# Patient Record
Sex: FEMALE | Race: AMERICAN INDIAN OR ALASKA NATIVE
[De-identification: names, ages, dates, MRNs, and addresses within clinical notes are randomized per-mention and may not be internally consistent; named-entity substitution may affect disease eponyms.]

---

## 2019-07-27 ENCOUNTER — HOSPITAL ENCOUNTER (OUTPATIENT)
Dept: HOSPITAL 5 - TRG | Age: 24
LOS: 1 days | Discharge: HOME | End: 2019-07-28
Attending: OBSTETRICS & GYNECOLOGY
Payer: MEDICAID

## 2019-07-27 DIAGNOSIS — O47.03: Primary | ICD-10-CM

## 2019-07-27 DIAGNOSIS — Z3A.37: ICD-10-CM

## 2019-07-27 PROCEDURE — 59025 FETAL NON-STRESS TEST: CPT

## 2019-07-28 ENCOUNTER — HOSPITAL ENCOUNTER (INPATIENT)
Dept: HOSPITAL 5 - TRG | Age: 24
LOS: 3 days | Discharge: HOME | End: 2019-07-31
Attending: OBSTETRICS & GYNECOLOGY | Admitting: OBSTETRICS & GYNECOLOGY
Payer: MEDICAID

## 2019-07-28 DIAGNOSIS — Z3A.37: ICD-10-CM

## 2019-07-28 DIAGNOSIS — Z23: ICD-10-CM

## 2019-07-28 DIAGNOSIS — O42.92: ICD-10-CM

## 2019-07-28 LAB
BUN SERPL-MCNC: 2 MG/DL (ref 7–17)
BUN/CREAT SERPL: 3 %
CALCIUM SERPL-MCNC: 9.1 MG/DL (ref 8.4–10.2)
HCT VFR BLD CALC: 31.8 % (ref 30.3–42.9)
HEMOLYSIS INDEX: 25
HGB BLD-MCNC: 10.6 GM/DL (ref 10.1–14.3)
MCHC RBC AUTO-ENTMCNC: 33 % (ref 30–34)
MCV RBC AUTO: 87 FL (ref 79–97)
PLATELET # BLD: 134 K/MM3 (ref 140–440)
RBC # BLD AUTO: 3.66 M/MM3 (ref 3.65–5.03)

## 2019-07-28 PROCEDURE — 80048 BASIC METABOLIC PNL TOTAL CA: CPT

## 2019-07-28 PROCEDURE — 00HU33Z INSERTION OF INFUSION DEVICE INTO SPINAL CANAL, PERCUTANEOUS APPROACH: ICD-10-PCS

## 2019-07-28 PROCEDURE — 84460 ALANINE AMINO (ALT) (SGPT): CPT

## 2019-07-28 PROCEDURE — 85027 COMPLETE CBC AUTOMATED: CPT

## 2019-07-28 PROCEDURE — 84450 TRANSFERASE (AST) (SGOT): CPT

## 2019-07-28 PROCEDURE — 81001 URINALYSIS AUTO W/SCOPE: CPT

## 2019-07-28 PROCEDURE — 85018 HEMOGLOBIN: CPT

## 2019-07-28 PROCEDURE — 10H07YZ INSERTION OF OTHER DEVICE INTO PRODUCTS OF CONCEPTION, VIA NATURAL OR ARTIFICIAL OPENING: ICD-10-PCS | Performed by: OBSTETRICS & GYNECOLOGY

## 2019-07-28 PROCEDURE — 83735 ASSAY OF MAGNESIUM: CPT

## 2019-07-28 PROCEDURE — 85014 HEMATOCRIT: CPT

## 2019-07-28 PROCEDURE — 59025 FETAL NON-STRESS TEST: CPT

## 2019-07-28 PROCEDURE — 83615 LACTATE (LD) (LDH) ENZYME: CPT

## 2019-07-28 PROCEDURE — 86850 RBC ANTIBODY SCREEN: CPT

## 2019-07-28 PROCEDURE — 36415 COLL VENOUS BLD VENIPUNCTURE: CPT

## 2019-07-28 PROCEDURE — 88307 TISSUE EXAM BY PATHOLOGIST: CPT

## 2019-07-28 PROCEDURE — 84550 ASSAY OF BLOOD/URIC ACID: CPT

## 2019-07-28 PROCEDURE — 87086 URINE CULTURE/COLONY COUNT: CPT

## 2019-07-28 PROCEDURE — 86900 BLOOD TYPING SEROLOGIC ABO: CPT

## 2019-07-28 PROCEDURE — 86901 BLOOD TYPING SEROLOGIC RH(D): CPT

## 2019-07-28 PROCEDURE — 86592 SYPHILIS TEST NON-TREP QUAL: CPT

## 2019-07-28 PROCEDURE — 3E0R3BZ INTRODUCTION OF ANESTHETIC AGENT INTO SPINAL CANAL, PERCUTANEOUS APPROACH: ICD-10-PCS

## 2019-07-28 PROCEDURE — 82565 ASSAY OF CREATININE: CPT

## 2019-07-28 PROCEDURE — 90715 TDAP VACCINE 7 YRS/> IM: CPT

## 2019-07-28 RX ADMIN — HYDRALAZINE HYDROCHLORIDE PRN MG: 20 INJECTION INTRAMUSCULAR; INTRAVENOUS at 20:04

## 2019-07-28 RX ADMIN — HYDRALAZINE HYDROCHLORIDE PRN MG: 20 INJECTION INTRAMUSCULAR; INTRAVENOUS at 19:13

## 2019-07-28 RX ADMIN — HYDRALAZINE HYDROCHLORIDE PRN MG: 20 INJECTION INTRAMUSCULAR; INTRAVENOUS at 21:55

## 2019-07-28 NOTE — PROCEDURE NOTE
OB Delivery Note





- Delivery


Date of Delivery: 19


Surgeon: YOVANY GUNTER ([facility])


Estimated blood loss: 200cc





- Vaginal


Delivery presentation: vertex


Delivery position: OA


Intrapartum events: PROM->1hr before delivery, preeclampsia


Delivery induction: none


Delivery augmentation: pitocin


Delivery monitor: external FHT, external uterine


Route of delivery: 


Delivery placenta: expressed


Delivery cord: 3 umbilical vessels


Episiotomy: none


Delivery laceration: none


Anesthesia: epidural


Delivery comments: 


Excellent maternal effort resulted in  of viable male infant at 1939. Head 

delivered OA, restituted LOT. Shoulders followed easily. Cord clamped and cut by

FOB. Placenta delivered intact at 194. Minimal bleeding noted. Fundus boggy 

after delivery of placenta, MALU firm. Pitocin IV administered. No lacerations 

noted. Fundus then firm, Cytotec 800mcg administered IL for prophylaxis of 

delayed hemorrhage. 





- Infant


  ** A


Apgar at 1 minute: 8


Apgar at 5 minutes: 9


Infant Gender: Male

## 2019-07-28 NOTE — EVENT NOTE
Date: 19


S: Pt presents to triage reporting strong contractions q5 minutes. Affirms 

positive fetal movement. Denies LOF or vaginal bleeding





O: FHT baseline 150 bpm, no decels, pos accels, mod variability


Contractions q5 minutes lasting 60 seconds


SVE /0





A: 25 yo  at 37.2 wks EGA in early labor


Membranes intact


Coping well





P: Recommend going home to rest with Benadryl


Return to hospital with 3-1-1 contractions, SROM, or DFM


Pt expresses understanding and agrees with plan

## 2019-07-28 NOTE — EVENT NOTE
Date: 07/28/19


Pt's SVE is 7/80/0 swollen anterior lip. Discussed r/b/a of IUPC, pt elects for 

it. Placed IUPC without blood flashback, pt tolerated well, FHT category 1 

throughout.

## 2019-07-28 NOTE — HISTORY AND PHYSICAL REPORT
History of Present Illness


Date of examination: 19


Date of admission: 


19 06:46





Chief complaint: 


Contractions


History of present illness: 


Pt is a 23 yo  at 37.2 wks EGA who presents with contractions q3 minutes. 

She affirms positive FM and denies LOF or vaginal bleeding. Deneis SAWYER, 

scotomata, swelling. She received prenatal care at [facility]. Her 

pregnancy has been complicated by Rubella equivocal status, quad screen positive

for T21 with NIPT low risk, glucose intolerance, and GBS positive status. 





Past History


Past Medical History: no pertinent history


Past Surgical History: no surgical history


Social history: no significant social history





- Obstetrical History


Expected Date of Delivery: 19


Actual Gestation: 37 Week(s) 2 Day(s) 


: 2


Para: 0


Hx # Term Pregnancies: 0


Number of  Pregnancies: 0


Spontaneous Abortions: 1


Number of Living Children: 0





Medications and Allergies


                                    Allergies











Allergy/AdvReac Type Severity Reaction Status Date / Time


 


No Known Allergies Allergy   Unverified 19 15:26











                                Home Medications











 Medication  Instructions  Recorded  Confirmed  Last Taken  Type


 


Ondansetron [Zofran Odt] 4 mg PO Q8HR PRN #10 tab.rapdis 19  Unknown Rx











Active Meds: 


Active Medications





Butorphanol Tartrate (Stadol)  2 mg IV Q2H PRN


   PRN Reason: Pain , Severe (7-10)


   Last Admin: 19 07:50 Dose:  2 mg


   Documented by: 


Ephedrine Sulfate (Ephedrine Sulfate)  10 mg IV Q2M PRN


   PRN Reason: Hypotension


Fentanyl (Sublimaze)  100 mcg IV Q2H PRN


   PRN Reason: Labor Pain


Hydralazine HCl (Apresoline)  5 mg IV Q30MIN PRN


   PRN Reason: Hypertension


Oxytocin/Sodium Chloride (Pitocin/Ns 20 Unit/1000ml Drip)  20 units in 1,000 mls

@ 125 mls/hr IV AS DIRECT GAVIOTA


Oxytocin/Sodium Chloride (Pitocin/Ns 30 Unit/500ml)  30 units in 500 mls @ 1 

mls/hr IV TITR GAVIOTA; Protocol


Oxytocin/Sodium Chloride (Pitocin/Ns 30 Unit/500ml)  30 units in 500 mls @ 2 

mls/hr IV TITR GAVIOTA; Protocol


Lactated Ringer's (Lactated Ringers)  1,000 mls @ 125 mls/hr IV AS DIRECT G[facility]IOTA


   Last Admin: 19 07:45 Dose:  125 mls/hr


   Documented by: 


Oxytocin/Sodium Chloride (Pitocin/Ns 20 Unit/1000ml Drip)  20 units in 1,000 mls

@ 0 mls/hr IV TITR GAVIOTA


Magnesium Sulfate (Magnesium Sulfate 40gm/1000ml)  40 gm in 1,000 mls @ 25 mls/h

r IV AS DIRECT GAVIOTA


Ampicillin Sodium (Ampicillin/Ns 1 Gm/50 Ml)  1 gm in 50 mls @ 100 mls/hr IV 

Q4HR GAVIOTA; Protocol


Lactated Ringer's (Lactated Ringers)  1,000 mls @ 125 mls/hr IV AS DIRECT GAVIOTA


Labetalol HCl (Normodyne)  10 mg IV ONCE PRN


   PRN Reason: Hypertension


Mineral Oil (Mineral Oil)  30 ml PO QHS PRN


   PRN Reason: Constipation


Nalbuphine HCl (Nubain)  10 mg IV Q2H PRN


   PRN Reason: Pain, Moderate (4-6)


Naloxone HCl (Narcan 0.4 Mg/1 Ml)  0.1 mg IV Q2MIN PRN


   PRN Reason: Res Rate </= 8 or 02 SAT < 92%


Ondansetron HCl (Zofran)  4 mg IV Q8H PRN


   PRN Reason: Nausea And Vomiting


Promethazine HCl (Phenergan)  25 mg PO Q6H PRN


   PRN Reason: Nausea And Vomiting


Terbutaline Sulfate (Brethine)  0.25 mg SUB-Q ONCE PRN


   PRN Reason: Hyperstimulation/Hypertonicity


Terbutaline Sulfate (Brethine)  0.25 mg IVP ONCE PRN


   PRN Reason: Hyperstimulation/Hypertonicity











Review of Systems


All systems: negative


Eyes: no blurred vision


Ears, nose, mouth and throat: no headache


Cardiovascular: no chest pain, no shortness of breath, no leg edema


Gastrointestinal: diarrhea, no nausea, no vomiting


Genitourinary: no vaginal bleeding, no leakage of fluid


Neurological: no seizures





- Vital Signs


Vital signs: 


                                   Vital Signs











Pulse BP


 


 68   171/96 


 


 19 06:25  19 06:25








                                        











Temp Pulse Resp BP Pulse Ox


 


    77      137/68    


 


    19 08:10     19 08:10   














- Physical Exam


Cardiovascular: Regular rate, Normal S1, Normal S2, No murmurs


Lungs: Positive: Clear to auscultation, Normal air movement


Abdomen: Positive: soft.  Negative: distention, tenderness


Genitourinary (Female): Positive: normal external genitalia, normal perenium


Vagina: Positive: normal moisture


Uterus: Positive: enlarged (gravid), normal contour


Extremities: Positive: normal





- Obstetrical


FHR: category 1


Uterine Contraction Monitor Mode: External


Cervical Dilatation: 3.5


Cervical Effacement Percentage: 90


Fetal station: 0


Uterine Contraction Frequency (min): 5


Uterine Contraction Pattern: Regular


Uterine Tone Measurement Phase: Contraction


Uterine Contraction Intensity: Strong/Firm





Results


Result Diagrams: 


                                 19 07:00





                                 19 07:00


                              Abnormal lab results











  19 Range/Units





  07:00 07:00 


 


WBC  11.2 H   (4.5-11.0)  K/mm3


 


RDW  15.9 H   (13.2-15.2)  %


 


Plt Count  134 L   (140-440)  K/mm3


 


Potassium   3.0 L  (3.6-5.0)  mmol/L


 


BUN   2 L  (7-17)  mg/dL


 


Glucose   103 H  ()  mg/dL








All other labs normal.








Assessment and Plan


A:


23 yo  at 37.2 wks EGA


Early labor


Preeclampsia with severe features


SROM in triage, clear fluid


GBS positive





P:


Admit to L&D


Initiate Magnesium Sulfate and rescue Labetalol


Ampicillin prophylaxis


Monitor closely


Augment with Pitocin


Pain relief as requested


Anticipate

## 2019-07-28 NOTE — ANESTHESIA CONSULTATION
Anesthesia Consult and Med Hx


Date of service: 07/28/19





- Airway


Anesthetic Teeth Evaluation: Good


ROM Head & Neck: Adequate


Mental/Hyoid Distance: Adequate


Mallampati Class: Class II


Intubation Access Assessment: Good





- Pulmonary Exam


CTA: Yes





- Cardiac Exam


Cardiac Exam: RRR





- Pre-Operative Health Status


ASA Pre-Surgery Classification: ASA2, Emergency


Proposed Anesthetic Plan: Epidural





- Pulmonary


Hx Asthma: No


COPD: No


Hx Pneumonia: No





- Cardiovascular System


Hx Hypertension: No





- Central Nervous System


Hx Seizures: No


Hx Psychiatric Problems: No





- Endocrine


Hx Renal Disease: No


Hx End Stage Renal Disease: No


Hx Hypothyroidism: No


Hx Hyperthyroidism: No





- Hematic


Hx Anemia: No


Hx Sickle Cell Disease: No





- Other Systems


Hx Alcohol Use: No

## 2019-07-29 LAB
ALT SERPL-CCNC: 11 UNITS/L (ref 7–56)
HCT VFR BLD CALC: 31.1 % (ref 30.3–42.9)
HGB BLD-MCNC: 10.5 GM/DL (ref 10.1–14.3)
MCHC RBC AUTO-ENTMCNC: 34 % (ref 30–34)
MCV RBC AUTO: 86 FL (ref 79–97)
PLATELET # BLD: 149 K/MM3 (ref 140–440)
RBC # BLD AUTO: 3.61 M/MM3 (ref 3.65–5.03)
URATE SERPL-MCNC: 4.5 MG/DL (ref 3.5–7.6)

## 2019-07-29 PROCEDURE — 3E0234Z INTRODUCTION OF SERUM, TOXOID AND VACCINE INTO MUSCLE, PERCUTANEOUS APPROACH: ICD-10-PCS | Performed by: OBSTETRICS & GYNECOLOGY

## 2019-07-29 RX ADMIN — DOCUSATE SODIUM SCH: 100 CAPSULE, LIQUID FILLED ORAL at 10:00

## 2019-07-29 RX ADMIN — OXYCODONE AND ACETAMINOPHEN PRN TAB: 5; 325 TABLET ORAL at 19:48

## 2019-07-29 RX ADMIN — IBUPROFEN SCH MG: 600 TABLET, FILM COATED ORAL at 19:47

## 2019-07-29 RX ADMIN — LABETALOL HYDROCHLORIDE SCH MG: 200 TABLET, FILM COATED ORAL at 21:53

## 2019-07-29 RX ADMIN — LABETALOL HYDROCHLORIDE SCH MG: 200 TABLET, FILM COATED ORAL at 08:28

## 2019-07-29 RX ADMIN — DOCUSATE SODIUM SCH MG: 100 CAPSULE, LIQUID FILLED ORAL at 21:52

## 2019-07-29 RX ADMIN — IBUPROFEN SCH: 600 TABLET, FILM COATED ORAL at 09:00

## 2019-07-29 RX ADMIN — IBUPROFEN SCH MG: 600 TABLET, FILM COATED ORAL at 23:57

## 2019-07-29 RX ADMIN — FERROUS SULFATE TAB 325 MG (65 MG ELEMENTAL FE) SCH MG: 325 (65 FE) TAB at 21:52

## 2019-07-29 RX ADMIN — FERROUS SULFATE TAB 325 MG (65 MG ELEMENTAL FE) SCH MG: 325 (65 FE) TAB at 09:53

## 2019-07-29 RX ADMIN — OXYCODONE AND ACETAMINOPHEN PRN TAB: 5; 325 TABLET ORAL at 08:27

## 2019-07-29 RX ADMIN — Medication SCH EACH: at 09:52

## 2019-07-29 NOTE — PROGRESS NOTE
Assessment and Plan





A/P


PPD1 s/p 


pree E 


on mag until 2pm ( duration of 24hrs PP) 


mag levels and neuro checks


PIH w/u 


await PP cbc ( uterine atony ) 





Subjective





- Subjective


Date of service: 19


Principal diagnosis: , severe Pre E


Patient reports: appetite normal, voiding normally, pain well controlled, 

flatus, ambulating normally


[place]: doing well





Objective





- Vital Signs


Latest vital signs: 


                                   Vital Signs











  Temp Pulse BP Pulse Ox


 


 19 07:47   81  145/73 


 


 19 07:32   88  163/78 


 


 19 07:17   85  162/78 


 


 19 07:02   93 H  157/74 


 


 19 06:47   85  158/74 


 


 19 06:32   96 H  150/70 


 


 19 06:17   89  148/67 


 


 19 06:02   96 H  154/68 


 


 19 05:47   100 H  137/63 


 


 19 05:32   108 H  138/70 


 


 19 05:17   96 H  145/67 


 


 19 05:02   113 H  137/67 


 


 19 04:47   100 H  150/67 


 


 19 04:32   103 H  147/65 


 


 19 04:17   105 H  153/66 


 


 19 04:02   106 H  152/69 


 


 19 03:47   106 H  149/63 


 


 19 03:32   103 H  147/65 


 


 19 03:17   113 H  156/67 


 


 19 03:02   116 H  157/69 


 


 19 02:47   115 H  156/72 


 


 19 02:32   109 H  154/72 


 


 19 02:17   103 H  152/75 


 


 19 02:02   130 H  150/70 


 


 19 01:47   120 H  161/72 


 


 19 01:32   116 H  159/72 


 


 19 01:17   114 H  155/70 


 


 19 01:02   114 H  155/68 


 


 19 00:47   126 H  159/69 


 


 19 00:32   123 H  156/70 


 


 19 00:17   116 H  156/71 


 


 19 00:02   108 H  164/76 


 


 19 23:47   125 H  158/67 


 


 19 23:32   117 H  160/70 


 


 19 23:17   113 H  158/69 


 


 19 23:02   130 H  143/62 


 


 19 22:47   123 H  137/61 


 


 19 22:32   126 H  152/62 


 


 19 22:17   126 H  157/71 


 


 19 22:02   120 H  158/74 


 


 19 21:55   108 H  172/82 


 


 19 21:47   108 H  172/82 


 


 19 21:32   125 H  170/79 


 


 19 21:17   118 H  160/73 


 


 19 21:02   112 H  160/95 


 


 19 20:47   121 H  187/97 


 


 19 20:34   121 H  184/89 


 


 19 20:32   131 H  227/135 


 


 19 20:17   126 H  179/99 


 


 19 20:04   110 H  171/102 


 


 19 19:47   114 H  179/97 


 


 19 19:34   120 H  176/97 


 


 19 19:22   112 H   100


 


 19 19:17   114 H   98


 


 19 19:13   98 H  168/84 


 


 19 19:12   108 H   99


 


 19 19:07   109 H   98


 


 19 19:02   103 H   99


 


 19 18:59   96 H  163/79 


 


 19 18:57   96 H   100


 


 19 18:56   98 H  168/84 


 


 19 18:52   110 H   98


 


 19 18:47   105 H   99


 


 19 18:42   96 H   99


 


 19 18:37   103 H   100


 


 19 18:32   97 H   100


 


 19 18:27   94 H   99


 


 19 18:26   93 H  145/77 


 


 19 18:22   94 H   100


 


 19 18:17   94 H   100


 


 19 18:12   99 H   100


 


 19 18:07   93 H   99


 


 19 18:02   91 H   99


 


 19 17:57   95 H   99


 


 19 17:56   98 H  149/87 


 


 19 17:52   94 H   99


 


 19 17:47   88   100


 


 19 17:42   91 H   100


 


 19 17:37   92 H   100


 


 19 17:32   98 H   99


 


 19 17:27   97 H   100


 


 19 17:26   90  133/73 


 


 19 17:22   95 H   100


 


 19 17:17   105 H   100


 


 19 17:12   98 H   98


 


 19 17:09   100 H  165/79 


 


 19 17:07   105 H   100


 


 19 17:02   111 H   100


 


 19 16:57   93 H   99


 


 19 16:52   94 H   99


 


 19 16:47   101 H   98


 


 19 16:42   91 H   97


 


 19 16:37   89   97


 


 19 16:32   89   97


 


 19 16:27   89   99


 


 19 16:23   89  165/85 


 


 19 16:22   105 H   98


 


 19 16:17   109 H   98


 


 19 16:13   94 H  165/86 


 


 19 16:12   96 H   99


 


 19 16:07   96 H   98


 


 19 16:02   92 H   98


 


 19 15:58   94 H  158/82 


 


 19 15:57   99 H   98


 


 19 15:52   107 H   99


 


 19 15:47   104 H   98


 


 19 15:43   94 H  158/80 


 


 19 15:42   98 H   99


 


 19 15:38   99 H  148/76 


 


 19 15:37   101 H   99


 


 19 15:32   106 H   99


 


 19 15:28   94 H  175/94 


 


 19 15:27   94 H   97


 


 19 15:22   92 H   98


 


 19 15:17   98 H   99


 


 19 15:13   95 H  165/87 


 


 19 15:12   100 H   98


 


 19 15:07   98 H   100


 


 19 15:02   102 H   100


 


 19 15:00   95 H  168/91 


 


 19 14:57   99 H   99


 


 19 14:52   101 H   100


 


 19 14:51   100 H   94


 


 19 14:47   99 H   100


 


 19 14:43   83  157/88 


 


 19 14:42   87   100


 


 19 14:37   87   100


 


 19 14:32   82   100


 


 19 14:28   81  158/88 


 


 19 14:27   84   100


 


 19 14:22   89   100


 


 19 14:17   92 H   100


 


 19 14:14   88  159/89 


 


 19 14:12   90   100


 


 19 14:07   100 H   100


 


 19 14:02   105 H   99


 


 19 13:59   87  154/78 


 


 19 13:58   93 H  174/86 


 


 19 13:57   84   100


 


 19 13:52   79   100


 


 19 13:47   83   100


 


 19 13:43   80  159/80 


 


 19 13:42   80   99


 


 19 13:37   83   100


 


 19 13:32   107 H   99


 


 19 13:28   82  142/86 


 


 19 13:27   85   99


 


 19 13:22   90   100


 


 19 13:17   91 H   100


 


 19 13:13   93 H  150/81  94


 


 19 13:12   84   99


 


 19 13:07   81   100


 


 19 13:02   76   100


 


 19 12:58   76  145/72 


 


 19 12:57   77   100


 


 19 12:52   76   100


 


 19 12:47   85   100


 


 19 12:44   75  132/72 


 


 19 12:42   84   100


 


 19 12:37   84   100


 


 19 12:32   81   99


 


 19 12:29   83   88


 


 19 12:28   75  136/64 


 


 19 12:27   78   98


 


 19 12:22   75   99


 


 19 12:17   77   99


 


 19 12:13   75  136/65 


 


 19 12:12   75   98


 


 19 12:07   77   99


 


 19 12:02   71   99


 


 19 11:58   72  127/62 


 


 19 11:57   72   99


 


 19 11:52   71   99


 


 19 11:47   70   99


 


 19 11:43   70  130/61 


 


 19 11:42   72   99


 


 19 11:37   69   99


 


 19 11:32   71   98


 


 19 11:28   69  129/60 


 


 19 11:27   71   99


 


 19 11:22   70   98


 


 19 11:17   74   98


 


 19 11:12   73  122/57  99


 


 19 11:09   89  146/66 


 


 19 11:07   91 H   99


 


 19 11:06   97 H  151/84 


 


 19 11:03   96 H  166/89 


 


 19 11:02   100 H   99


 


 19 10:57   93 H   100


 


 19 10:55   78  178/106 


 


 19 10:52   85   100


 


 19 10:45   77  174/86 


 


 19 10:38   77  167/86 


 


 19 10:29   75  174/89 


 


 19 10:12   90  182/94 


 


 19 10:11   97 H  186/91 


 


 19 09:39   73  156/78 


 


 19 08:50  98.0 F   


 


 19 08:40   68  148/76 


 


 19 08:37   73  136/75 


 


 19 08:10   77  137/68 








                                Intake and Output











 19





 23:59 07:59 15:59


 


Intake Total 615 2700 


 


Output Total 3500 3700 


 


Balance -2885 -1000 


 


Intake:   


 


   1000 


 


    MAGNESIUM SULFATE 40GM/ 150 400 





    1000ML 40 gm In 1,000 ml   





    @ 1 GM/HR 25 mls/hr IV AS   





    DIRECT GAVIOTA Rx#:[id number]   


 


    PITOCin/NS 20 UNIT/1000ML 225 600 





    DRIP 20 units In 1,000   





    ml @ 250 mls/hr IV AS   





    DIRECT GAVIOTA Rx#:[id number]   


 


  Oral 240 1700 


 


Output:   


 


  Urine 3500 3700 


 


    Indwelling Catheter 3500 3700 


 


Other:   


 


  Total, Intake Amount 240 250 


 


  Total, Output Amount 400 300 


 


  Estimated Blood Loss 200  














- Exam


Breasts: Present: normal


Cardiovascular: Present: Regular rate, Normal S1


Lungs: Present: Clear to auscultation, Normal air movement


Abdomen: Present: normal appearance, soft, normal bowel sounds.  Absent: 

distention, tenderness, guarding


Vulva: both: normal


Uterus: Present: normal, firm, fundal height below umbilicus.  Absent: 

bogginess, tenderness


Extremities: Present: normal


Deep Tendon Reflex Grade: Normal +2





- Labs


Labs: 


                              Abnormal lab results











  19 Range/Units





  20:33 02:12 


 


Magnesium  4.20 H  5.30 H  (1.7-2.3)  mg/dL

## 2019-07-30 LAB
BILIRUB UR QL STRIP: (no result)
BLOOD UR QL VISUAL: (no result)
HCT VFR BLD CALC: 27.4 % (ref 30.3–42.9)
HGB BLD-MCNC: 9 GM/DL (ref 10.1–14.3)
HYALINE CASTS #/AREA URNS LPF: 2 /LPF
MUCOUS THREADS #/AREA URNS HPF: (no result) /HPF
PH UR STRIP: 7 [PH] (ref 5–7)
PROT UR STRIP-MCNC: (no result) MG/DL
RBC #/AREA URNS HPF: > 182 /HPF (ref 0–6)
UROBILINOGEN UR-MCNC: < 2 MG/DL (ref ?–2)
WBC #/AREA URNS HPF: 26 /HPF (ref 0–6)

## 2019-07-30 PROCEDURE — 3E0234Z INTRODUCTION OF SERUM, TOXOID AND VACCINE INTO MUSCLE, PERCUTANEOUS APPROACH: ICD-10-PCS | Performed by: OBSTETRICS & GYNECOLOGY

## 2019-07-30 RX ADMIN — IBUPROFEN SCH MG: 600 TABLET, FILM COATED ORAL at 05:55

## 2019-07-30 RX ADMIN — FERROUS SULFATE TAB 325 MG (65 MG ELEMENTAL FE) SCH MG: 325 (65 FE) TAB at 22:17

## 2019-07-30 RX ADMIN — FERROUS SULFATE TAB 325 MG (65 MG ELEMENTAL FE) SCH MG: 325 (65 FE) TAB at 22:20

## 2019-07-30 RX ADMIN — LABETALOL HYDROCHLORIDE SCH MG: 200 TABLET, FILM COATED ORAL at 09:40

## 2019-07-30 RX ADMIN — DOCUSATE SODIUM SCH MG: 100 CAPSULE, LIQUID FILLED ORAL at 09:42

## 2019-07-30 RX ADMIN — IBUPROFEN SCH MG: 600 TABLET, FILM COATED ORAL at 16:32

## 2019-07-30 RX ADMIN — IBUPROFEN SCH MG: 600 TABLET, FILM COATED ORAL at 22:20

## 2019-07-30 RX ADMIN — FERROUS SULFATE TAB 325 MG (65 MG ELEMENTAL FE) SCH MG: 325 (65 FE) TAB at 09:41

## 2019-07-30 RX ADMIN — Medication SCH EACH: at 09:40

## 2019-07-30 RX ADMIN — LABETALOL HYDROCHLORIDE SCH MG: 200 TABLET, FILM COATED ORAL at 22:20

## 2019-07-30 RX ADMIN — DOCUSATE SODIUM SCH MG: 100 CAPSULE, LIQUID FILLED ORAL at 22:21

## 2019-07-30 NOTE — PROGRESS NOTE
Assessment and Plan





A/P


PPD2 s/p 


pree E s/p mag


on labetolol for BP control


continue to watch BP consider d/c home with f/u in 1 weeks tomorrow as BP revi

ewed 





Subjective





- Subjective


Date of service: 19


Principal diagnosis: , severe Pre E


Patient reports: appetite normal, voiding normally, pain well controlled, 

flatus, ambulating normally


: doing well





Objective





- Vital Signs


Latest vital signs: 


                                   Vital Signs











  Temp Pulse Resp BP BP Pulse Ox


 


 19 05:55    20   


 


 19 05:00  98.3 F  71  16  134/75   98


 


 19 23:57    20   


 


 19 23:31  98.5 F  78  16  129/68   98


 


 19 21:53   72   155/95  


 


 19 20:37  98.4 F  79  20   149/94  99


 


 19 20:07   88   158/90  


 


 19 19:57   88   166/88  


 


 19 19:34  97.4 F L   18   


 


 19 19:28   90   141/87  


 


 19 18:57   88   157/103  


 


 19 18:27   85   156/99  


 


 19 17:57   90   150/95  


 


 19 17:27   85   133/87  


 


 19 16:57   88   130/89  


 


 19 16:31   86   144/83  


 


 19 15:57   88   134/85  


 


 19 15:27   96 H   146/68  


 


 19 14:57   76   136/76  


 


 19 14:27   88   139/84  


 


 19 13:57   83   133/70  


 


 19 13:27   89   130/70  


 


 19 12:57   86   120/56  


 


 19 12:27   78   129/58  


 


 19 11:57   82   132/79  


 


 19 11:27   84   131/75  


 


 19 10:57   90   131/67  


 


 19 10:27   87   132/65  


 


 19 09:57   81   142/73  


 


 19 09:47   86   135/71  


 


 19 09:27   85   139/91  


 


 19 08:57   85   160/94  


 


 19 08:28     166/104  


 


 19 08:27    16   


 


 19 08:25   90   166/104  


 


 19 08:17   84   145/74  








                                Intake and Output











 19





 23:59 07:59 15:59


 


Intake Total  240 


 


Output Total 1600  


 


Balance -1600 240 


 


Intake:   


 


  Intake, Free Water  240 


 


Output:   


 


  Urine 1600  


 


    Indwelling Catheter 1000  


 


    Void 600  


 


Other:   


 


  Total, Output Amount 600  


 


  # Voids   


 


    Void 1 1 














- Exam


Breasts: Present: normal


Cardiovascular: Present: Regular rate, Normal S1


Lungs: Present: Clear to auscultation, Normal air movement


Abdomen: Present: normal appearance, soft, normal bowel sounds.  Absent: 

distention, tenderness, guarding


Uterus: Present: normal, firm, fundal height below umbilicus.  Absent: 

bogginess, tenderness


Extremities: Present: normal


Deep Tendon Reflex Grade: Normal +2


Incision: Present: normal, dry





- Labs


Labs: 


                              Abnormal lab results











  19 Range/Units





  00:50 08:12 08:12 


 


WBC   16.8 H   (4.5-11.0)  K/mm3


 


RBC   3.61 L   (3.65-5.03)  M/mm3


 


Hgb     (10.1-14.3)  gm/dl


 


Hct     (30.3-42.9)  %


 


RDW   16.4 H   (13.2-15.2)  %


 


Creatinine     (0.7-1.2)  mg/dL


 


Magnesium    5.90 H  (1.7-2.3)  mg/dL


 


Lactate Dehydrogenase     ()  units/L


 


Urine WBC (Auto)  26.0 H    (0.0-6.0)  /HPF














  19 Range/Units





  08:12 14:37 21:13 


 


WBC     (4.5-11.0)  K/mm3


 


RBC     (3.65-5.03)  M/mm3


 


Hgb     (10.1-14.3)  gm/dl


 


Hct     (30.3-42.9)  %


 


RDW     (13.2-15.2)  %


 


Creatinine  0.6 L    (0.7-1.2)  mg/dL


 


Magnesium   6.40 H  4.60 H  (1.7-2.3)  mg/dL


 


Lactate Dehydrogenase  467 H    ()  units/L


 


Urine WBC (Auto)     (0.0-6.0)  /HPF














  19 Range/Units





  00:45 


 


WBC   (4.5-11.0)  K/mm3


 


RBC   (3.65-5.03)  M/mm3


 


Hgb  9.0 L  (10.1-14.3)  gm/dl


 


Hct  27.4 L  (30.3-42.9)  %


 


RDW   (13.2-15.2)  %


 


Creatinine   (0.7-1.2)  mg/dL


 


Magnesium   (1.7-2.3)  mg/dL


 


Lactate Dehydrogenase   ()  units/L


 


Urine WBC (Auto)   (0.0-6.0)  /HPF

## 2019-07-31 VITALS — DIASTOLIC BLOOD PRESSURE: 68 MMHG | SYSTOLIC BLOOD PRESSURE: 114 MMHG

## 2019-07-31 RX ADMIN — LABETALOL HYDROCHLORIDE SCH: 200 TABLET, FILM COATED ORAL at 10:23

## 2019-07-31 RX ADMIN — Medication SCH EACH: at 10:20

## 2019-07-31 RX ADMIN — IBUPROFEN SCH MG: 600 TABLET, FILM COATED ORAL at 04:26

## 2019-07-31 RX ADMIN — DOCUSATE SODIUM SCH MG: 100 CAPSULE, LIQUID FILLED ORAL at 10:19

## 2019-07-31 RX ADMIN — IBUPROFEN SCH MG: 600 TABLET, FILM COATED ORAL at 10:21

## 2019-07-31 RX ADMIN — FERROUS SULFATE TAB 325 MG (65 MG ELEMENTAL FE) SCH MG: 325 (65 FE) TAB at 10:19

## 2019-07-31 NOTE — DISCHARGE SUMMARY
Providers





- Providers


Date of Admission: 


19 06:46





Date of discharge: 19


Attending physician: 


ISRAEL REED





                                        





19 20:12


Consult to Lactation Consultant [CONS] Routine 


   Reason For Exam: assistance with breastfeeding, SNS











Primary care physician: 


ISRAEL REED








Hospitalization


Reason for admission: other (Preeclampsia with severe features in early labor)


Delivery: 


Episiotomy: none


Laceration: none


Other postpartum procedures: none


Postpartum complications: none


Discharge diagnosis: IUP at term delivered


 baby: male


Condition at discharge: Good


Disposition: DC-01 TO HOME OR SELFCARE





Plan





- Discharge Medications


Prescriptions: 


Docusate Sodium [Colace] 100 mg PO BID PRN #30 capsule


 PRN Reason: Constipation


Ferrous Sulfate [Ferrous Sulfate 324 MG] 324 mg PO BID #60 tablet.


Labetalol [Labetalol 200mg TAB] 200 mg PO BID #60 tablet


Ibuprofen [Motrin] 800 mg PO Q8HR PRN #90 tablet


 PRN Reason: Pain, Moderate (4-6)





- Provider Discharge Summary


Activity: routine, no sex for 6 weeks, no heavy lifting 4 weeks, no strenuous 

exercise


Diet: routine


Instructions: routine


Additional instructions: 


[]  Smoking cessation referral if applicable(refer to patient education folder 

for contact #)


[]  Refer to [facility]'s [facility] Booklet








Call your doctor immediately for:


* Fever > 100.5


* Heavy vaginal bleeding ( >1 pad per hour)


* Severe persistent headache


* Shortness of breath


* Reddened, hot, painful area to leg or breast


* Drainage or odor from incision.





* Keep incision clean and dry at all times and follow doctor's instructions 

regarding bathing/showering











- Follow up plan


Follow up: 


ISRAEL REED MD [Primary Care Provider] - 7 Days

## 2019-07-31 NOTE — PROGRESS NOTE
Assessment and Plan


A: PPD3 s/p mag sulfate for preeclampsia with severe features


VSS, CBC stable





P: Discharge to home today


PO Labetalol rx


Return to clinic in 1 week for BP check


Reviewed preeclampsia symptoms and when to report to hospital





Subjective





- Subjective


Date of service: 19


Principal diagnosis: , severe Pre E


Interval history: 


Pt is PPD3 s/p  following augmentation of labor for severe preeclampsia at 

term. No lacs, .


Patient reports: appetite normal, voiding normally, pain well controlled, 

ambulating normally


: doing well, bottle feeding





Objective





- Vital Signs


Latest vital signs: 


                                   Vital Signs











  Temp Pulse Resp BP BP Pulse Ox


 


 19 00:30  98.8 F  67  18   123/79 


 


 19 22:20   77   141/71  


 


 19 16:54  99.1 F  79  16  150/86   99


 


 19 16:32    20   








                                Intake and Output











 19





 23:59 07:59 15:59


 


Intake Total  300 


 


Balance  300 


 


Intake:   


 


  Intake, Free Water  300 














- Exam


Cardiovascular: Present: Regular rate, Normal S1, Normal S2, No murmurs


Lungs: Present: Clear to auscultation, Normal air movement


Abdomen: Present: normal appearance, soft.  Absent: distention, tenderness, 

guarding


Uterus: Present: normal, firm, fundal height below umbilicus


Extremities: Present: normal

## 2019-11-13 ENCOUNTER — HOSPITAL ENCOUNTER (EMERGENCY)
Dept: HOSPITAL 5 - ED | Age: 24
Discharge: HOME | End: 2019-11-13
Payer: SELF-PAY

## 2019-11-13 VITALS — DIASTOLIC BLOOD PRESSURE: 81 MMHG | SYSTOLIC BLOOD PRESSURE: 142 MMHG

## 2019-11-13 DIAGNOSIS — L05.01: Primary | ICD-10-CM

## 2019-11-13 DIAGNOSIS — F12.10: ICD-10-CM

## 2019-11-13 DIAGNOSIS — Z79.1: ICD-10-CM

## 2019-11-13 DIAGNOSIS — Z79.899: ICD-10-CM

## 2019-11-13 PROCEDURE — 96372 THER/PROPH/DIAG INJ SC/IM: CPT

## 2019-11-13 PROCEDURE — 99282 EMERGENCY DEPT VISIT SF MDM: CPT

## 2019-11-13 PROCEDURE — 10080 I&D PILONIDAL CYST SIMPLE: CPT

## 2019-11-13 NOTE — EMERGENCY DEPARTMENT REPORT
ED General Adult HPI





- General


Chief complaint: Rectal Pain


Stated complaint: BOTTOM HURTING BADLY


Source: patient


Mode of arrival: Ambulatory


Limitations: No Limitations





- History of Present Illness


Initial comments: 


23yo BF states that she has had discomfort of her buttock for a week but pain 

became severe as of yesterday. She states that the pain is a 10. She further 

states that the pain is worse with movement and when lying down.





-: Gradual, week(s)


Location: buttocks


Radiation: non-radiation


Severity scale (0 -10): 10


Quality: aching, sharp


Consistency: constant


Improves with: immobilization


Worsens with: movement


Associated Symptoms: denies other symptoms


Treatments Prior to Arrival: none





- Related Data


                                  Previous Rx's











 Medication  Instructions  Recorded  Last Taken  Type


 


Docusate Sodium [Colace] 100 mg PO BID PRN #30 capsule 07/31/19 Unknown Rx


 


Ferrous Sulfate [Ferrous Sulfate 324 mg PO BID #60 tablet. 07/31/19 Unknown Rx





324 MG]    


 


Ibuprofen [Motrin] 800 mg PO Q8HR PRN #90 tablet 07/31/19 Unknown Rx


 


Labetalol [Labetalol 200mg TAB] 200 mg PO BID #60 tablet 07/31/19 Unknown Rx


 


Clindamycin [Clindamycin CAP] 300 mg PO Q8H 10 Days #30 cap 11/13/19 Unknown Rx


 


HYDROcodone/APAP 5-325 [[place] 1 each PO Q4HR PRN #8 tablet 11/13/19 Unknown Rx





5/325]    











                                    Allergies











Allergy/AdvReac Type Severity Reaction Status Date / Time


 


No Known Allergies Allergy   Unverified 01/01/19 15:26














ED Review of Systems


ROS: 


Stated complaint: BOTTOM HURTING BADLY


Other details as noted in HPI





Comment: All other systems reviewed and negative


Skin: as per HPI





ED Past Medical Hx





- Past Medical History


Previous Medical History?: No


Hx Hypertension: No


Hx Congestive Heart Failure: No


Hx Diabetes: No


Hx Deep Vein Thrombosis: No


Hx Renal Disease: No


Hx Sickle Cell Disease: No


Hx Seizures: No


Hx Asthma: No


Hx COPD: No


Hx HIV: No





- Surgical History


Past Surgical History?: No





- Social History


Smoking Status: Never Smoker


Substance Use Type: Marijuana





- Medications


Home Medications: 


                                Home Medications











 Medication  Instructions  Recorded  Confirmed  Last Taken  Type


 


Docusate Sodium [Colace] 100 mg PO BID PRN #30 capsule 07/31/19  Unknown Rx


 


Ferrous Sulfate [Ferrous Sulfate 324 mg PO BID #60 tablet. 07/31/19  Unknown 

Rx





324 MG]     


 


Ibuprofen [Motrin] 800 mg PO Q8HR PRN #90 tablet 07/31/19  Unknown Rx


 


Labetalol [Labetalol 200mg TAB] 200 mg PO BID #60 tablet 07/31/19  Unknown Rx


 


Clindamycin [Clindamycin CAP] 300 mg PO Q8H 10 Days #30 cap 11/13/19  Unknown Rx


 


HYDROcodone/APAP 5-325 [[place] 1 each PO Q4HR PRN #8 tablet 11/13/19  Unknown Rx





5/325]     














ED Physical Exam





- General


Limitations: No Limitations


General appearance: alert, in no apparent distress





- Head


Head exam: Present: atraumatic, normocephalic





- Eye


Eye exam: Present: normal appearance, PERRL, EOMI





- ENT


ENT exam: Present: normal exam, normal orophraynx





- Neck


Neck exam: Present: normal inspection, full ROM.  Absent: tenderness





- Respiratory


Respiratory exam: Present: normal lung sounds bilaterally, respiratory distress.

 Absent: chest wall tenderness





- Cardiovascular


Cardiovascular Exam: Present: regular rate, normal rhythm, normal heart sounds





- GI/Abdominal


GI/Abdominal exam: Present: soft.  Absent: distended, tenderness





- Rectal


Rectal exam: Present: normal rectal tone, tenderness (appreciated at the gluteal

cleft and swelling present as well).  Absent: bloody stool, hemorrhoids





- Extremities Exam


Extremities exam: Present: normal inspection, full ROM.  Absent: tenderness





- Back Exam


Back exam: Present: normal inspection, full ROM, tenderness (lower midback)





- Neurological Exam


Neurological exam: Present: alert, altered, oriented X3





- Psychiatric


Psychiatric exam: Present: normal affect, normal mood.  Absent: depressed





- Skin


Skin exam: Present: warm, dry, other (inflamed at gluteal cleft)





ED Course


                                   Vital Signs











  11/13/19





  09:29


 


Temperature 97.5 F L


 


Pulse Rate 89


 


Respiratory 16





Rate 


 


Blood Pressure 142/81


 


O2 Sat by Pulse 100





Oximetry 














- Procedure Description


Procedures done: I&D of the gluteal cleft. Pt tolerated procedure well. Pt was 

given Toradol injection, followed by Lidocaine and Epinephrine at the site for 

local anesthesia. The site was packed and a bandaghe was placed over it. She 

verbalized that pressure was released and pain decreased.





ED Medical Decision Making





- Medical Decision Making


23yo BF states that she has had discomfort of her buttock for a week but pain 

became severe as of yesterday. She states that the pain is a 10. She further 

states that the pain is worse with movement and when lying down. I&D performed. 

Pt was given Toradol injection, followed by Lidocaine and Epinephrine at the 

site for local anesthesia. The site was packed and a bandaghe was placed over 

it. Pt tolerated procedure well, she was instructed to have PCP reasess wound in

3 days, take medications as instructed and to f/u with ED as needed. Pt was 

instructed to not drink, drive, lift heavy objects or operate heavy equipment 

while using Norco.








Critical care attestation.: 


If time is entered above; I have spent that time in minutes in the direct care 

of this critically ill patient, excluding procedure time.








ED Disposition


Clinical Impression: 


 Pilonidal abscess





Disposition: DC-01 TO HOME OR SELFCARE


Is pt being admited?: No


Does the pt Need Aspirin: No


Condition: Stable


Instructions:  Abscess (ED)


Additional Instructions: 


Pt tolerated procedure well, she was instructed to have PCP reasess wound in 3 

days, take medications as instructed and to f/u with ED as needed. Pt was 

instructed to not drink, drive, lift heavy objects or operate heavy equipment 

while using Norco.


Prescriptions: 


Clindamycin [Clindamycin CAP] 300 mg PO Q8H 10 Days #30 cap


HYDROcodone/APAP 5-325 [[place] 5/325] 1 each PO Q4HR PRN #8 tablet


 PRN Reason: Pain


Referrals: 


[facility] [Outside] - 3-5 Days


Time of Disposition: 11:52

## 2019-11-19 ENCOUNTER — HOSPITAL ENCOUNTER (EMERGENCY)
Dept: HOSPITAL 5 - ED | Age: 24
Discharge: LEFT BEFORE DISCHARGE | End: 2019-11-19
Payer: SELF-PAY

## 2019-11-19 VITALS — SYSTOLIC BLOOD PRESSURE: 121 MMHG | DIASTOLIC BLOOD PRESSURE: 78 MMHG

## 2019-11-19 DIAGNOSIS — Z53.21: ICD-10-CM

## 2019-11-19 DIAGNOSIS — L02.31: Primary | ICD-10-CM

## 2019-11-20 ENCOUNTER — HOSPITAL ENCOUNTER (EMERGENCY)
Dept: HOSPITAL 5 - ED | Age: 24
Discharge: HOME | End: 2019-11-20
Payer: SELF-PAY

## 2019-11-20 VITALS — DIASTOLIC BLOOD PRESSURE: 74 MMHG | SYSTOLIC BLOOD PRESSURE: 121 MMHG

## 2019-11-20 DIAGNOSIS — Z79.899: ICD-10-CM

## 2019-11-20 DIAGNOSIS — L05.01: Primary | ICD-10-CM

## 2019-11-20 DIAGNOSIS — F17.200: ICD-10-CM

## 2019-11-20 PROCEDURE — 96372 THER/PROPH/DIAG INJ SC/IM: CPT

## 2019-11-20 PROCEDURE — 10080 I&D PILONIDAL CYST SIMPLE: CPT

## 2019-11-20 PROCEDURE — 99282 EMERGENCY DEPT VISIT SF MDM: CPT

## 2019-11-20 NOTE — EMERGENCY DEPARTMENT REPORT
Abscess Boil HPI





- HPI


Chief Complaint: Skin/Abscess/Foreign Body


Stated Complaint: BOIL ON BUTTOCK


Time Seen by Provider: 11/20/19 10:06


Duration: 1 Week


Location: Sacral/Pilonidal


Severity: Severe


History: Yes Pain, Yes Purulent Drainage, No Fever, No Numbness, No Foreign 

Body, No Previous History, No Insect Bite


HPI: 24-year-old female presents to the ER today complaining of abscess to area.

 Patient states that it started about one week ago.  He was seen here a couple 

days ago for the same abscess.  Had an I&D done, she states she was given a 

"shot" and discharged home with clindamycin and hydrocodone.  Patient returns 

today complaining that she still has significant pain to the pilonidal area and 

swelling. She reports mild drainage from wound but nothing significant. She 

states she is taking the clindamycin as prescribed.  She has completed 

hydrocodone, but was not really helping the pain.  She denies any fever or 

chills.  She denies history of abscesses in the past.  She denies any history of

diabetes, or any immunocompromise disease.


Home Medications: 


                                  Previous Rx's











 Medication  Instructions  Recorded  Last Taken  Type


 


Docusate Sodium [Colace] 100 mg PO BID PRN #30 capsule 07/31/19 Unknown Rx


 


Ferrous Sulfate [Ferrous Sulfate 324 mg PO BID #60 tablet. 07/31/19 Unknown Rx





324 MG]    


 


Ibuprofen [Motrin] 800 mg PO Q8HR PRN #90 tablet 07/31/19 Unknown Rx


 


labetaloL [Labetalol 200mg TAB] 200 mg PO BID #60 tablet 07/31/19 Unknown Rx


 


Clindamycin [Clindamycin CAP] 300 mg PO Q8H 10 Days #30 cap 11/13/19 Unknown Rx


 


HYDROcodone/APAP  [[place] 1 each PO Q6HR PRN #12 tablet 11/20/19 Unknown Rx





10/325]    











Allergies/Adverse Reactions: 


                                    Allergies











Allergy/AdvReac Type Severity Reaction Status Date / Time


 


No Known Allergies Allergy   Unverified 01/01/19 15:26














ED Review of Systems


ROS: 


Stated complaint: BOIL ON BUTTOCK


Other details as noted in HPI





Comment: All other systems reviewed and negative


Skin: rash, other (abscesss, pilonidal area)





ED Past Medical Hx





- Past Medical History


Previous Medical History?: No


Hx Hypertension: No


Hx Congestive Heart Failure: No


Hx Diabetes: No


Hx Deep Vein Thrombosis: No


Hx Renal Disease: No


Hx Sickle Cell Disease: No


Hx Seizures: No


Hx Asthma: No


Hx COPD: No


Hx HIV: No





- Surgical History


Past Surgical History?: No





- Social History


Smoking Status: Current Every Day Smoker





- Medications


Home Medications: 


                                Home Medications











 Medication  Instructions  Recorded  Confirmed  Last Taken  Type


 


Docusate Sodium [Colace] 100 mg PO BID PRN #30 capsule 07/31/19  Unknown Rx


 


Ferrous Sulfate [Ferrous Sulfate 324 mg PO BID #60 tablet.dr 07/31/19  Unknown 

Rx





324 MG]     


 


Ibuprofen [Motrin] 800 mg PO Q8HR PRN #90 tablet 07/31/19  Unknown Rx


 


labetaloL [Labetalol 200mg TAB] 200 mg PO BID #60 tablet 07/31/19  Unknown Rx


 


Clindamycin [Clindamycin CAP] 300 mg PO Q8H 10 Days #30 cap 11/13/19  Unknown Rx


 


HYDROcodone/APAP  [[place] 1 each PO Q6HR PRN #12 tablet 11/20/19  Unknown 

Rx





10/325]     














ED Abscess Boil Physical Exam





- Exam


General: 


Vital signs noted. No distress. Alert and acting appropriately.





Front/Back of Body, Lg (Color): 


                            __________________________














                            __________________________





 1 - approximately 4cm by 5cm indurated, fluctuant area with mild erythema 

noted. Previous incision appears closed without any drainage. Area is severely 

ttp. No streaking erythema noted.





Exam: Yes Tenderness, Yes Fluctuance, Yes Surrounding Cellulites/Erythema, Yes 

Normal Neurologic Exam, Yes Normal Circulation, No Lymphangitis, No Crepitation,

No Heart Murmur





I & D Note





- I & D Note


I & D Note: Location -- Pilonidal.  Anesthesia - Lidocaine 1% with epi.  Blade 

-- 11 inch.  Drainage -- Large amount of pus drained.  Packing -- 1/2 inch 

iodoform placed.  Patient tolerated procedure well, without any complications.





ED Course


                                   Vital Signs











  11/20/19





  09:00


 


Temperature 98.8 F


 


Pulse Rate 64


 


Respiratory 16





Rate 


 


Blood Pressure 125/76


 


O2 Sat by Pulse 97





Oximetry 











Critical care attestation.: 


If time is entered above; I have spent that time in minutes in the direct care 

of this critically ill patient, excluding procedure time.








ED Disposition


Clinical Impression: 


 Pilonidal abscess





Disposition: DC-01 TO HOME OR SELFCARE


Is pt being admited?: No


Does the pt Need Aspirin: No


Condition: Stable


Instructions:  Abscess Incision and Drainage (ED)


Additional Instructions: 


Do not remove packing until you follow up with clinic in 2 days. Continue and 

finish the clindamycin. Take pain medication as prescribed. 


Prescriptions: 


HYDROcodone/APAP  [[place] 10/325] 1 each PO Q6HR PRN #12 tablet


 PRN Reason: Pain


Referrals: 


[facility] [Primary Care Provider] - 3-5 Days


[facility] [Provider Group] - 2-3 Days (Follow-up in 2 days for 

wound check. )


Time of Disposition: 12:17